# Patient Record
Sex: MALE | Race: WHITE
[De-identification: names, ages, dates, MRNs, and addresses within clinical notes are randomized per-mention and may not be internally consistent; named-entity substitution may affect disease eponyms.]

---

## 2019-02-04 ENCOUNTER — HOSPITAL ENCOUNTER (EMERGENCY)
Dept: HOSPITAL 88 - FSED | Age: 79
Discharge: HOME | End: 2019-02-04
Payer: MEDICARE

## 2019-02-04 VITALS — SYSTOLIC BLOOD PRESSURE: 120 MMHG | DIASTOLIC BLOOD PRESSURE: 60 MMHG

## 2019-02-04 VITALS — BODY MASS INDEX: 42.66 KG/M2 | HEIGHT: 72 IN | WEIGHT: 315 LBS

## 2019-02-04 DIAGNOSIS — R53.1: Primary | ICD-10-CM

## 2019-02-04 DIAGNOSIS — M10.9: ICD-10-CM

## 2019-02-04 DIAGNOSIS — N30.90: ICD-10-CM

## 2019-02-04 DIAGNOSIS — E11.65: ICD-10-CM

## 2019-02-04 DIAGNOSIS — I51.9: ICD-10-CM

## 2019-02-04 DIAGNOSIS — I10: ICD-10-CM

## 2019-02-04 PROCEDURE — 80076 HEPATIC FUNCTION PANEL: CPT

## 2019-02-04 PROCEDURE — 93005 ELECTROCARDIOGRAM TRACING: CPT

## 2019-02-04 PROCEDURE — 85025 COMPLETE CBC W/AUTO DIFF WBC: CPT

## 2019-02-04 PROCEDURE — 87400 INFLUENZA A/B EACH AG IA: CPT

## 2019-02-04 PROCEDURE — 84484 ASSAY OF TROPONIN QUANT: CPT

## 2019-02-04 PROCEDURE — 99284 EMERGENCY DEPT VISIT MOD MDM: CPT

## 2019-02-04 PROCEDURE — 81003 URINALYSIS AUTO W/O SCOPE: CPT

## 2019-02-04 PROCEDURE — 80053 COMPREHEN METABOLIC PANEL: CPT

## 2019-02-04 PROCEDURE — 71045 X-RAY EXAM CHEST 1 VIEW: CPT

## 2019-02-04 NOTE — DIAGNOSTIC IMAGING REPORT
Examination: Single AP view of the chest.



COMPARISON: None.



INDICATION: Generalized weakness

     

DISCUSSION:



Lines/tubes:  None.



Lungs:  Clear. No pneumonia or edema.



Pleura:  Cardiomegaly.



Heart and mediastinum:  The heart and the mediastinum are unremarkable.



Bones and soft tissues:  No acute bony abnormalities.     



IMPRESSION:

 

1.   Cardiomegaly without decompensation



Signed by: Dr. Andrew Palisch, M.D. on 2/4/2019 3:11 PM

## 2019-02-04 NOTE — NUR
PT UNABLE TO URINATE TO GIVE URINE SAMPLE. PT AGREED TO ALLOW FOR  STRAIGHT 
CATH PT FOR URNINE SAMPLE. PT TOLERATED WELL.

## 2019-08-19 ENCOUNTER — HOSPITAL ENCOUNTER (EMERGENCY)
Dept: HOSPITAL 88 - ER | Age: 79
Discharge: HOME | End: 2019-08-19
Payer: MEDICARE

## 2019-08-19 VITALS — WEIGHT: 315 LBS | HEIGHT: 72 IN | BODY MASS INDEX: 42.66 KG/M2

## 2019-08-19 DIAGNOSIS — S00.83XA: Primary | ICD-10-CM

## 2019-08-19 DIAGNOSIS — E11.9: ICD-10-CM

## 2019-08-19 DIAGNOSIS — I48.91: ICD-10-CM

## 2019-08-19 DIAGNOSIS — E78.5: ICD-10-CM
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
## 2019-08-19 DIAGNOSIS — S80.212A: ICD-10-CM

## 2019-08-19 DIAGNOSIS — Z86.73: ICD-10-CM

## 2019-08-19 DIAGNOSIS — I10: ICD-10-CM

## 2019-08-19 PROCEDURE — 72125 CT NECK SPINE W/O DYE: CPT

## 2019-08-19 PROCEDURE — 72170 X-RAY EXAM OF PELVIS: CPT

## 2019-08-19 PROCEDURE — 99284 EMERGENCY DEPT VISIT MOD MDM: CPT

## 2019-08-19 PROCEDURE — 70450 CT HEAD/BRAIN W/O DYE: CPT

## 2019-08-19 PROCEDURE — 73562 X-RAY EXAM OF KNEE 3: CPT

## 2019-08-19 NOTE — DIAGNOSTIC IMAGING REPORT
Left knee, 3 views.

Pelvis, 2 views.



History: Fall, pain.



Findings: Vascular calcifications are present. There is increased suprapatellar

soft tissue density of the left knee. The bones are diffusely osteopenic. There

is no evidence of fracture or dislocation. There are no lytic or sclerotic

lesions. Mild degenerative changes are present throughout the left knee and

bilateral hips.





IMPRESSION:

Bilateral hip and left knee DJD with probable left knee joint effusion. No

acute osseous abnormality.



Signed by: Brandon Heaton on 8/19/2019 3:20 PM

## 2019-08-19 NOTE — XMS REPORT
Patient Summary Document

                             Created on: 2019



ALEX GRAVES

External Reference #: [id number]

: 1940

Sex: Male



Demographics







                          Address                   [address] [place] [address] 

[place]  [address]

 

                          Home Phone                [phone]

 

                          Preferred Language        Unknown

 

                          Marital Status            Unknown

 

                          Taoist Affiliation     Unknown

 

                          Race                      Unknown

 

                          Ethnic Group              Unknown





Author







                          Author                    [facility]

 

                          Address                   Unknown

 

                          Phone                     Unavailable







Care Team Providers







                    Care Team Member Name    Role                Phone

 

                    DARLENE MONCADA    Unavailable         Unavailable







Problems

This patient has no known problems.



Allergies, Adverse Reactions, Alerts

This patient has no known allergies or adverse reactions.



Medications

This patient has no known medications.



Results







           Test Description    Test Time    Test Comments    Text Results    Atomic Results    Result

 Comments

 

                CXR [facility]    2019 15:11:00                                                          

                                                [place] [address]      Patient Name: ALEX GRAVES                                
  MR #: [id number]                     : 1940                          
        Age/Sex: 78/M  Acct #: [id number]                              Req #: 
[id number]  Adm Physician:                                                      
Ordered by: DEISY MONCADA MD                            Report #: [id number]
       Location: [facility]                                    Room/Bed:              
      
___________________________________________________________________________________________________
   Procedure: [id number] HOPD/CXR [facility]  Exam Date: 19              
             Exam Time: 1440                                              REPORT
STATUS: Signed    Examination: Single AP view of the chest.      COMPARISON: 
None.      INDICATION: Generalized weakness           DISCUSSION:      
Lines/tubes:  None.      Lungs:  Clear. No pneumonia or edema.      Pleura:  
Cardiomegaly.      Heart and mediastinum:  The heart and the mediastinum are 
unremarkable.      Bones and soft tissues:  No acute bony abnormalities.        
  IMPRESSION:       1.   Cardiomegaly without decompensation      Signed by: Dr.
Andrew Palisch, M.D. on 2019 3:11 PM        Dictated By: ANDREW R PALISCH MD
 Electronically Signed By: ANDREW R PALISCH MD on 19 1511  Transcribed By:
EUGENIE on 19 1511       COPY TO:   DEISY MONCADA MD

## 2019-08-19 NOTE — DIAGNOSTIC IMAGING REPORT
History:Fell yesterday without loss of consciousness. Head pain.

Comparison studies:None



Technique:

Axial images were obtained from the skull base to the vertex.

Coronal and sagittal images reconstructed from the axial data.

Intravenous contrast: None

Dose modulation, iterative reconstruction, and/or weight based adjustment of

the mA/kV was utilized to reduce the radiation dose to as low as reasonably

achievable.



Findings:



Scalp/skull: 

No abnormalities.



Extra-axial spaces: 

No masses.  No fluid collections.



Brain sulci: Mildly prominent.

Ventricles: Mild compensatory dilatation. No hydrocephalus.



Parenchyma: 

Few hypodensities in the supratentorial white matter are small vessel ischemic

changes. No masses, hemorrhage, acute or chronic cortical vascular insults.



Sellar/suprasellar region: No abnormalities.

Craniocervical junction: Patent foramen magnum.  No Chiari one malformation.



Incidental findings:

Atherosclerotic calcifications in the carotid siphons and right vertebral

artery .



Cataract surgery changes.



Impression:



No acute abnormalities.



Chronic findings:

1.  Mild generalized volume loss.

2.  Mild supratentorial white matter small vessel ischemic changes.



Signed by: DR Jose Roberto Javier M.D. on 8/19/2019 2:40 PM